# Patient Record
Sex: FEMALE | URBAN - METROPOLITAN AREA
[De-identification: names, ages, dates, MRNs, and addresses within clinical notes are randomized per-mention and may not be internally consistent; named-entity substitution may affect disease eponyms.]

---

## 2018-02-28 ENCOUNTER — THERAPY VISIT (OUTPATIENT)
Dept: PHYSICAL THERAPY | Facility: CLINIC | Age: 18
End: 2018-02-28
Payer: COMMERCIAL

## 2018-02-28 DIAGNOSIS — G89.29 CHRONIC PATELLOFEMORAL PAIN OF LEFT KNEE: Primary | ICD-10-CM

## 2018-02-28 DIAGNOSIS — M25.562 CHRONIC PATELLOFEMORAL PAIN OF LEFT KNEE: Primary | ICD-10-CM

## 2018-02-28 PROCEDURE — 97112 NEUROMUSCULAR REEDUCATION: CPT | Mod: GP | Performed by: PHYSICAL THERAPIST

## 2018-02-28 PROCEDURE — 97110 THERAPEUTIC EXERCISES: CPT | Mod: GP | Performed by: PHYSICAL THERAPIST

## 2018-02-28 PROCEDURE — 97161 PT EVAL LOW COMPLEX 20 MIN: CPT | Mod: GP | Performed by: PHYSICAL THERAPIST

## 2018-02-28 ASSESSMENT — ACTIVITIES OF DAILY LIVING (ADL)
GO DOWN STAIRS: ACTIVITY IS MINIMALLY DIFFICULT
WEAKNESS: THE SYMPTOM AFFECTS MY ACTIVITY SLIGHTLY
STIFFNESS: I HAVE THE SYMPTOM BUT IT DOES NOT AFFECT MY ACTIVITY
GO UP STAIRS: ACTIVITY IS MINIMALLY DIFFICULT
HOW_WOULD_YOU_RATE_THE_OVERALL_FUNCTION_OF_YOUR_KNEE_DURING_YOUR_USUAL_DAILY_ACTIVITIES?: ABNORMAL
KNEEL ON THE FRONT OF YOUR KNEE: ACTIVITY IS NOT DIFFICULT
AS_A_RESULT_OF_YOUR_KNEE_INJURY,_HOW_WOULD_YOU_RATE_YOUR_CURRENT_LEVEL_OF_DAILY_ACTIVITY?: NEARLY NORMAL
STAND: ACTIVITY IS MINIMALLY DIFFICULT
GIVING WAY, BUCKLING OR SHIFTING OF KNEE: THE SYMPTOM AFFECTS MY ACTIVITY MODERATELY
PAIN: THE SYMPTOM AFFECTS MY ACTIVITY SLIGHTLY
RISE FROM A CHAIR: ACTIVITY IS NOT DIFFICULT
SIT WITH YOUR KNEE BENT: ACTIVITY IS NOT DIFFICULT
SQUAT: ACTIVITY IS SOMEWHAT DIFFICULT
RAW_SCORE: 56
HOW_WOULD_YOU_RATE_THE_CURRENT_FUNCTION_OF_YOUR_KNEE_DURING_YOUR_USUAL_DAILY_ACTIVITIES_ON_A_SCALE_FROM_0_TO_100_WITH_100_BEING_YOUR_LEVEL_OF_KNEE_FUNCTION_PRIOR_TO_YOUR_INJURY_AND_0_BEING_THE_INABILITY_TO_PERFORM_ANY_OF_YOUR_USUAL_DAILY_ACTIVITIES?: 20
LIMPING: I DO NOT HAVE THE SYMPTOM
SWELLING: I DO NOT HAVE THE SYMPTOM
KNEE_ACTIVITY_OF_DAILY_LIVING_SUM: 56
KNEE_ACTIVITY_OF_DAILY_LIVING_SCORE: 80
WALK: ACTIVITY IS MINIMALLY DIFFICULT

## 2018-02-28 NOTE — PROGRESS NOTES
[facility] Initial Evaluation  Subjective:  Patient is a 17 year old female presenting with rehab left knee hpi. The history is provided by the patient. No  was used.   Crescencio Moreno is a 17 year old female with a left knee condition.  Condition occurred with:  Insidious onset.  Condition occurred: for unknown reasons.  This is a chronic condition  March 20, 2014 left MPFL and her knee has been bothering her especially working at [employer] 40 hours a week with standing. .    Patient reports pain:  Medial.    Pain is described as aching and sharp and is intermittent Pain Scale: ranges .5-5/10.  Associated symptoms:  Loss of strength and edema. Pain is the same all the time.  Symptoms are exacerbated by standing, walking, ascending stairs and descending stairs and relieved by rest, ice and NSAID's.  Since onset symptoms are gradually worsening.  Special tests:  X-ray (2-19-18 Left Knee).  Previous treatment includes physical therapy and surgery.  There was moderate improvement following previous treatment.  General health as reported by patient is good.                  Barriers include:  None as reported by the patient.    Red flags:  None as reported by the patient.                        Objective:  Standing Alignment:              Knee:  Genu recuvatum L and genu recurvatum R  Ankle/Foot:  Pes planus L and pes planus R                                                          Knee Evaluation:  ROM:  Strength wnl knee: Good Quad Set, Strong and painfree knee ext and flexion.    PROM    Hyperextension: Left: 15    Right:  10    Flexion: Left: 135    Right:  140        Ligament Testing:  Normal                Special Tests:     Left knee negative for the following special tests:  Meninscal; Patellar compression; Patellar Tracking-Abduction Medial and Patellar Tracking-Abduction Lateral    Palpation:    Left knee tenderness present at:  Patellar Medial  Left knee tenderness not  present at:  Medial Joint Line; Lateral Joint Line; Patellar Tendon; Patellar Lateral; Patellar Superior and Patellar Inferior    Edema:  Normal    Mobility Testing:  Not Assessed            Functional Testing:  not assessed                  General     ROS    Assessment/Plan:    Patient is a 17 year old female with left side knee complaints.    Patient has the following significant findings with corresponding treatment plan.                  Diagnosis 1:  Chronic Knee Pain S/P Left MPFL (2014)      Pain -  hot/cold therapy, splint/taping/bracing/orthotics, self management, education and home program  Decreased strength - therapeutic exercise, therapeutic activities and home program  Impaired muscle performance - neuro re-education and home program  Decreased function - therapeutic activities and home program    Therapy Evaluation Codes:   1) History comprised of:   Personal factors that impact the plan of care:      Time since onset of symptoms.    Comorbidity factors that impact the plan of care are:      None.     Medications impacting care: None.  2) Examination of Body Systems comprised of:   Body structures and functions that impact the plan of care:      Knee.   Activity limitations that impact the plan of care are:      Stairs, Standing and Walking.  3) Clinical presentation characteristics are:   Stable/Uncomplicated.  4) Decision-Making    Low complexity using standardized patient assessment instrument and/or measureable assessment of functional outcome.  Cumulative Therapy Evaluation is: Low complexity.    Previous and current functional limitations:  (See Goal Flow Sheet for this information)    Short term and Long term goals: (See Goal Flow Sheet for this information)     Communication ability:  Patient appears to be able to clearly communicate and understand verbal and written communication and follow directions correctly.  Treatment Explanation - The following has been discussed with the patient:   RX  ordered/plan of care  Anticipated outcomes  Possible risks and side effects  This patient would benefit from PT intervention to resume normal activities.   Rehab potential is good.    Frequency:  2 X week, once daily  Duration:  for 6 weeks  Discharge Plan:  Achieve all LTG.  Independent in home treatment program.  Return to previous functional level by discharge.  Reach maximal therapeutic benefit.    Please refer to the daily flowsheet for treatment today, total treatment time and time spent performing 1:1 timed codes.

## 2018-03-07 ENCOUNTER — THERAPY VISIT (OUTPATIENT)
Dept: PHYSICAL THERAPY | Facility: CLINIC | Age: 18
End: 2018-03-07
Payer: COMMERCIAL

## 2018-03-07 DIAGNOSIS — M25.562 CHRONIC PATELLOFEMORAL PAIN OF LEFT KNEE: ICD-10-CM

## 2018-03-07 DIAGNOSIS — G89.29 CHRONIC PATELLOFEMORAL PAIN OF LEFT KNEE: ICD-10-CM

## 2018-03-07 PROCEDURE — 97530 THERAPEUTIC ACTIVITIES: CPT | Mod: GP | Performed by: PHYSICAL THERAPIST

## 2018-03-07 PROCEDURE — 97110 THERAPEUTIC EXERCISES: CPT | Mod: GP | Performed by: PHYSICAL THERAPIST

## 2018-03-07 PROCEDURE — 97112 NEUROMUSCULAR REEDUCATION: CPT | Mod: GP | Performed by: PHYSICAL THERAPIST

## 2018-03-12 ENCOUNTER — THERAPY VISIT (OUTPATIENT)
Dept: PHYSICAL THERAPY | Facility: CLINIC | Age: 18
End: 2018-03-12
Payer: COMMERCIAL

## 2018-03-12 DIAGNOSIS — G89.29 CHRONIC PATELLOFEMORAL PAIN OF LEFT KNEE: ICD-10-CM

## 2018-03-12 DIAGNOSIS — M25.562 CHRONIC PATELLOFEMORAL PAIN OF LEFT KNEE: ICD-10-CM

## 2018-03-12 PROCEDURE — 97110 THERAPEUTIC EXERCISES: CPT | Mod: GP | Performed by: PHYSICAL THERAPIST

## 2018-03-12 PROCEDURE — 97112 NEUROMUSCULAR REEDUCATION: CPT | Mod: GP | Performed by: PHYSICAL THERAPIST

## 2018-03-12 PROCEDURE — 97530 THERAPEUTIC ACTIVITIES: CPT | Mod: GP | Performed by: PHYSICAL THERAPIST

## 2018-03-15 ENCOUNTER — THERAPY VISIT (OUTPATIENT)
Dept: PHYSICAL THERAPY | Facility: CLINIC | Age: 18
End: 2018-03-15
Payer: COMMERCIAL

## 2018-03-15 DIAGNOSIS — G89.29 CHRONIC PATELLOFEMORAL PAIN OF LEFT KNEE: ICD-10-CM

## 2018-03-15 DIAGNOSIS — M25.562 CHRONIC PATELLOFEMORAL PAIN OF LEFT KNEE: ICD-10-CM

## 2018-03-15 PROCEDURE — 97112 NEUROMUSCULAR REEDUCATION: CPT | Mod: GP | Performed by: PHYSICAL THERAPIST

## 2018-03-15 PROCEDURE — 97530 THERAPEUTIC ACTIVITIES: CPT | Mod: GP | Performed by: PHYSICAL THERAPIST

## 2018-03-15 PROCEDURE — 97110 THERAPEUTIC EXERCISES: CPT | Mod: GP | Performed by: PHYSICAL THERAPIST

## 2018-04-09 ENCOUNTER — THERAPY VISIT (OUTPATIENT)
Dept: PHYSICAL THERAPY | Facility: CLINIC | Age: 18
End: 2018-04-09
Payer: COMMERCIAL

## 2018-04-09 DIAGNOSIS — M25.562 CHRONIC PATELLOFEMORAL PAIN OF LEFT KNEE: ICD-10-CM

## 2018-04-09 DIAGNOSIS — G89.29 CHRONIC PATELLOFEMORAL PAIN OF LEFT KNEE: ICD-10-CM

## 2018-04-09 PROCEDURE — 97014 ELECTRIC STIMULATION THERAPY: CPT | Mod: GP | Performed by: PHYSICAL THERAPIST

## 2018-04-09 PROCEDURE — 97035 APP MDLTY 1+ULTRASOUND EA 15: CPT | Mod: GP | Performed by: PHYSICAL THERAPIST

## 2018-04-19 ENCOUNTER — THERAPY VISIT (OUTPATIENT)
Dept: PHYSICAL THERAPY | Facility: CLINIC | Age: 18
End: 2018-04-19
Payer: COMMERCIAL

## 2018-04-19 DIAGNOSIS — G89.29 CHRONIC PATELLOFEMORAL PAIN OF LEFT KNEE: ICD-10-CM

## 2018-04-19 DIAGNOSIS — M25.562 CHRONIC PATELLOFEMORAL PAIN OF LEFT KNEE: ICD-10-CM

## 2018-04-19 PROCEDURE — 97033 APP MDLTY 1+IONTPHRSIS EA 15: CPT | Mod: GP | Performed by: PHYSICAL THERAPIST

## 2018-04-23 ENCOUNTER — THERAPY VISIT (OUTPATIENT)
Dept: PHYSICAL THERAPY | Facility: CLINIC | Age: 18
End: 2018-04-23
Payer: COMMERCIAL

## 2018-04-23 DIAGNOSIS — M25.562 CHRONIC PATELLOFEMORAL PAIN OF LEFT KNEE: ICD-10-CM

## 2018-04-23 DIAGNOSIS — G89.29 CHRONIC PATELLOFEMORAL PAIN OF LEFT KNEE: ICD-10-CM

## 2018-04-23 PROCEDURE — 97112 NEUROMUSCULAR REEDUCATION: CPT | Mod: GP | Performed by: PHYSICAL THERAPIST

## 2018-04-23 PROCEDURE — 97530 THERAPEUTIC ACTIVITIES: CPT | Mod: GP | Performed by: PHYSICAL THERAPIST

## 2018-04-23 PROCEDURE — 97033 APP MDLTY 1+IONTPHRSIS EA 15: CPT | Mod: GP | Performed by: PHYSICAL THERAPIST

## 2018-05-03 ENCOUNTER — THERAPY VISIT (OUTPATIENT)
Dept: PHYSICAL THERAPY | Facility: CLINIC | Age: 18
End: 2018-05-03
Payer: COMMERCIAL

## 2018-05-03 DIAGNOSIS — G89.29 CHRONIC PATELLOFEMORAL PAIN OF LEFT KNEE: ICD-10-CM

## 2018-05-03 DIAGNOSIS — M25.562 CHRONIC PATELLOFEMORAL PAIN OF LEFT KNEE: ICD-10-CM

## 2018-05-03 PROCEDURE — 97112 NEUROMUSCULAR REEDUCATION: CPT | Mod: GP | Performed by: PHYSICAL THERAPIST

## 2018-05-03 PROCEDURE — 97033 APP MDLTY 1+IONTPHRSIS EA 15: CPT | Mod: GP | Performed by: PHYSICAL THERAPIST

## 2018-05-03 PROCEDURE — 97110 THERAPEUTIC EXERCISES: CPT | Mod: GP | Performed by: PHYSICAL THERAPIST

## 2018-05-03 ASSESSMENT — ACTIVITIES OF DAILY LIVING (ADL)
HOW_WOULD_YOU_RATE_THE_CURRENT_FUNCTION_OF_YOUR_KNEE_DURING_YOUR_USUAL_DAILY_ACTIVITIES_ON_A_SCALE_FROM_0_TO_100_WITH_100_BEING_YOUR_LEVEL_OF_KNEE_FUNCTION_PRIOR_TO_YOUR_INJURY_AND_0_BEING_THE_INABILITY_TO_PERFORM_ANY_OF_YOUR_USUAL_DAILY_ACTIVITIES?: 75
SQUAT: ACTIVITY IS VERY DIFFICULT
WALK: ACTIVITY IS MINIMALLY DIFFICULT
KNEEL ON THE FRONT OF YOUR KNEE: ACTIVITY IS NOT DIFFICULT
KNEE_ACTIVITY_OF_DAILY_LIVING_SCORE: 80
STIFFNESS: I HAVE THE SYMPTOM BUT IT DOES NOT AFFECT MY ACTIVITY
PAIN: THE SYMPTOM AFFECTS MY ACTIVITY MODERATELY
RISE FROM A CHAIR: ACTIVITY IS NOT DIFFICULT
HOW_WOULD_YOU_RATE_THE_OVERALL_FUNCTION_OF_YOUR_KNEE_DURING_YOUR_USUAL_DAILY_ACTIVITIES?: ABNORMAL
WEAKNESS: I DO NOT HAVE THE SYMPTOM
RAW_SCORE: 56
STAND: ACTIVITY IS SOMEWHAT DIFFICULT
SWELLING: I DO NOT HAVE THE SYMPTOM
SIT WITH YOUR KNEE BENT: ACTIVITY IS NOT DIFFICULT
KNEE_ACTIVITY_OF_DAILY_LIVING_SUM: 56
AS_A_RESULT_OF_YOUR_KNEE_INJURY,_HOW_WOULD_YOU_RATE_YOUR_CURRENT_LEVEL_OF_DAILY_ACTIVITY?: ABNORMAL
GO DOWN STAIRS: ACTIVITY IS MINIMALLY DIFFICULT
GO UP STAIRS: ACTIVITY IS MINIMALLY DIFFICULT
LIMPING: I HAVE THE SYMPTOM BUT IT DOES NOT AFFECT MY ACTIVITY
GIVING WAY, BUCKLING OR SHIFTING OF KNEE: I DO NOT HAVE THE SYMPTOM

## 2018-05-09 ENCOUNTER — THERAPY VISIT (OUTPATIENT)
Dept: PHYSICAL THERAPY | Facility: CLINIC | Age: 18
End: 2018-05-09
Payer: COMMERCIAL

## 2018-05-09 DIAGNOSIS — G89.29 CHRONIC PATELLOFEMORAL PAIN OF LEFT KNEE: ICD-10-CM

## 2018-05-09 DIAGNOSIS — M25.562 CHRONIC PATELLOFEMORAL PAIN OF LEFT KNEE: ICD-10-CM

## 2018-05-09 PROCEDURE — 97530 THERAPEUTIC ACTIVITIES: CPT | Mod: GP | Performed by: PHYSICAL THERAPIST

## 2018-05-09 PROCEDURE — 97033 APP MDLTY 1+IONTPHRSIS EA 15: CPT | Mod: GP | Performed by: PHYSICAL THERAPIST

## 2018-05-09 PROCEDURE — 97112 NEUROMUSCULAR REEDUCATION: CPT | Mod: GP | Performed by: PHYSICAL THERAPIST

## 2018-05-14 ENCOUNTER — THERAPY VISIT (OUTPATIENT)
Dept: PHYSICAL THERAPY | Facility: CLINIC | Age: 18
End: 2018-05-14
Payer: COMMERCIAL

## 2018-05-14 DIAGNOSIS — G89.29 CHRONIC PATELLOFEMORAL PAIN OF LEFT KNEE: ICD-10-CM

## 2018-05-14 DIAGNOSIS — M25.562 CHRONIC PATELLOFEMORAL PAIN OF LEFT KNEE: ICD-10-CM

## 2018-05-14 PROCEDURE — 97530 THERAPEUTIC ACTIVITIES: CPT | Mod: GP | Performed by: PHYSICAL THERAPIST

## 2018-05-14 PROCEDURE — 97112 NEUROMUSCULAR REEDUCATION: CPT | Mod: GP | Performed by: PHYSICAL THERAPIST

## 2018-05-14 PROCEDURE — 97033 APP MDLTY 1+IONTPHRSIS EA 15: CPT | Mod: GP | Performed by: PHYSICAL THERAPIST

## 2018-05-21 ENCOUNTER — THERAPY VISIT (OUTPATIENT)
Dept: PHYSICAL THERAPY | Facility: CLINIC | Age: 18
End: 2018-05-21
Payer: COMMERCIAL

## 2018-05-21 DIAGNOSIS — M25.562 CHRONIC PATELLOFEMORAL PAIN OF LEFT KNEE: ICD-10-CM

## 2018-05-21 DIAGNOSIS — G89.29 CHRONIC PATELLOFEMORAL PAIN OF LEFT KNEE: ICD-10-CM

## 2018-05-21 PROCEDURE — 97033 APP MDLTY 1+IONTPHRSIS EA 15: CPT | Mod: GP | Performed by: PHYSICAL THERAPIST

## 2018-05-21 NOTE — LETTER
[facility]  [address] [address] [place] [id number]  [phone]    2018    Re: Crescencio Moreno   :   2000  MRN:  [id number]   REFERRING PHYSICIAN:   Yeison Aguilar    [facility]    Date of Initial Evaluation:  2018  Visits:  Rxs Used: 11  Reason for Referral:  Chronic patellofemoral pain of left knee    DISCHARGE REPORT  Progress reporting period is from 18 to 18.       SUBJECTIVE  Subjective changes noted by patient: Per SOAP note 18: Crescencio feeling sick today and is not up for exercising. Ionto seems to be helping and allowing less pain left knee and able to stand at work for longer periods. Currently able to walk 30 mins before experiencing pain.  Current pain level is: 1/10.   Previous pain level was: 5/10.   Changes in function:  Yes (See Goal flowsheet attached for changes in current functional level).  Adverse reaction to treatment or activity: None    OBJECTIVE  Changes noted in objective findings:  Patient has failed to return to therapy so current objective findings are unknown.  Per SOAP note 18:  Iontophoresis only today per patient. Discussed importance of strengthening. 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 She will be attending school in [place] next Fall.      ASSESSMENT/PLAN  Updated problem list and treatment plan: Diagnosis 1:  Left Knee pain S/P MPFL repair       Pain -  hot/cold therapy, self management, iontophoresis, education and home program  Decreased strength - therapeutic exercise, therapeutic activities and home program  Impaired muscle performance - neuro re-education and home program  Decreased function - therapeutic activities and home program  STG/LTGs have been met or progress has been made towards goals:  Yes (See Goal flow sheet completed today.)  Assessment of Progress: The patient's condition is improving.  The patient has not returned to therapy. Current  status is unknown.  Patient is meeting short term goals and is progressing towards long term goals.  Self Management Plans:  Patient is independent in a home treatment program.  Patient is independent in self management of symptoms.  I have re-evaluated this patient and find that the nature, scope, duration and intensity of the therapy is appropriate for the medical condition of the patient.  [facility] continues to require the following intervention to meet STG and LTG's:  PT intervention is no longer required to meet STG/LTG.    Recommendations:  This patient is ready to be discharged from therapy and continue their home treatment program.    Thank you for your referral.    INQUIRIES  Therapist: Jasmin Cm, GIA   [address] [address]  Phone: [phone]  Fax: [phone]

## 2018-07-02 PROBLEM — M25.562 CHRONIC PATELLOFEMORAL PAIN OF LEFT KNEE: Status: RESOLVED | Noted: 2018-02-28 | Resolved: 2018-07-02

## 2018-07-02 PROBLEM — G89.29 CHRONIC PATELLOFEMORAL PAIN OF LEFT KNEE: Status: RESOLVED | Noted: 2018-02-28 | Resolved: 2018-07-02

## 2018-07-02 NOTE — PROGRESS NOTES
Subjective:  HPI                    Objective:  System    Physical Exam    General     ROS    Assessment/Plan:    DISCHARGE REPORT    Progress reporting period is from 2-28-18 to 5-21-18.       SUBJECTIVE  Subjective changes noted by patient: Per SOAP note 5-21-18: Crescencio feeling sick today and is not up for exercising. Ionto seems to be helping and allowing less pain left knee and able to stand at work for longer periods. Currently able to walk 30 mins before experiencing pain.    Current pain level is: 1/10.     Previous pain level was: 5/10.   Changes in function:  Yes (See Goal flowsheet attached for changes in current functional level)  Adverse reaction to treatment or activity: None    OBJECTIVE  Changes noted in objective findings:  Patient has failed to return to therapy so current objective findings are unknown.  Per SOAP note 5-21-18:  Iontophoresis only today per patient. Discussed importance of strengthening. 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 She will be attending school in [place] next Fall.      ASSESSMENT/PLAN  Updated problem list and treatment plan: Diagnosis 1:  Left Knee pain S/P MPFL repair 2014      Pain -  hot/cold therapy, self management, iontophoresis, education and home program  Decreased strength - therapeutic exercise, therapeutic activities and home program  Impaired muscle performance - neuro re-education and home program  Decreased function - therapeutic activities and home program  STG/LTGs have been met or progress has been made towards goals:  Yes (See Goal flow sheet completed today.)  Assessment of Progress: The patient's condition is improving.  The patient has not returned to therapy. Current status is unknown.  Patient is meeting short term goals and is progressing towards long term goals.  Self Management Plans:  Patient is independent in a home treatment program.  Patient is independent in self management of symptoms.  I have re-evaluated  this patient and find that the nature, scope, duration and intensity of the therapy is appropriate for the medical condition of the patient.  [facility] continues to require the following intervention to meet STG and LTG's:  PT intervention is no longer required to meet STG/LTG.    Recommendations:  This patient is ready to be discharged from therapy and continue their home treatment program.    Please refer to the daily flowsheet for treatment today, total treatment time and time spent performing 1:1 timed codes.